# Patient Record
Sex: FEMALE | Race: WHITE | NOT HISPANIC OR LATINO
[De-identification: names, ages, dates, MRNs, and addresses within clinical notes are randomized per-mention and may not be internally consistent; named-entity substitution may affect disease eponyms.]

---

## 2017-01-04 ENCOUNTER — APPOINTMENT (OUTPATIENT)
Dept: UROLOGY | Facility: CLINIC | Age: 52
End: 2017-01-04

## 2017-01-04 VITALS
DIASTOLIC BLOOD PRESSURE: 71 MMHG | HEIGHT: 65 IN | BODY MASS INDEX: 28.32 KG/M2 | SYSTOLIC BLOOD PRESSURE: 111 MMHG | HEART RATE: 62 BPM | WEIGHT: 170 LBS | TEMPERATURE: 97.9 F

## 2017-01-04 DIAGNOSIS — R31.9 HEMATURIA, UNSPECIFIED: ICD-10-CM

## 2017-08-22 ENCOUNTER — RESULT REVIEW (OUTPATIENT)
Age: 52
End: 2017-08-22

## 2018-09-10 ENCOUNTER — RESULT REVIEW (OUTPATIENT)
Age: 53
End: 2018-09-10

## 2019-09-06 ENCOUNTER — RECORD ABSTRACTING (OUTPATIENT)
Age: 54
End: 2019-09-06

## 2019-09-06 DIAGNOSIS — Z86.39 PERSONAL HISTORY OF OTHER ENDOCRINE, NUTRITIONAL AND METABOLIC DISEASE: ICD-10-CM

## 2019-09-06 LAB — CYTOLOGY CVX/VAG DOC THIN PREP: NORMAL

## 2019-09-17 ENCOUNTER — APPOINTMENT (OUTPATIENT)
Dept: OBGYN | Facility: CLINIC | Age: 54
End: 2019-09-17
Payer: COMMERCIAL

## 2019-09-17 VITALS
HEIGHT: 65 IN | DIASTOLIC BLOOD PRESSURE: 80 MMHG | SYSTOLIC BLOOD PRESSURE: 100 MMHG | WEIGHT: 179 LBS | BODY MASS INDEX: 29.82 KG/M2

## 2019-09-17 PROCEDURE — 99396 PREV VISIT EST AGE 40-64: CPT

## 2019-09-17 NOTE — REVIEW OF SYSTEMS
[Nl] : Hematologic/Lymphatic [Irregular Cycle Intervals] : periods are irregular [Hot Flashes] : hot flashes

## 2019-09-17 NOTE — HISTORY OF PRESENT ILLNESS
[Good] : being in good health [Healthy Diet] : a healthy diet [Regular Exercise] : regular exercise [Last Mammogram ___] : Last Mammogram was [unfilled] [Last Bone Density ___] : Last bone density studies [unfilled] [Last Pap ___] : Last cervical pap smear was [unfilled] [Pregnancy History] : pregnancy history: [Total Preg ___] : [unfilled] [Full Term ___] : [unfilled] [Premature ___] : [unfilled] [Living ___] : [unfilled] [Menarche Age: ____] : age at menarche was [unfilled] [Sexually Active] : is sexually active [Monogamous] : is monogamous [Contraception] : uses contraception [Condoms] : uses condoms [Male ___] : [unfilled] male [No] : no [Last Colonoscopy ___] : Last colonoscopy [unfilled] [Menstrual Problems] : reports normal menses [Hot Flashes] : hot flashes [Night Sweats] : night sweats [Definite:  ___ (Date)] : the last menstrual period was [unfilled] [Experiencing Menopausal Sxs] : experiencing menopausal symptoms

## 2019-09-17 NOTE — PHYSICAL EXAM
[Alert] : alert [Awake] : awake [Mass] : no breast mass [Nipple Discharge] : no nipple discharge [Soft] : soft [Axillary LAD] : no axillary lymphadenopathy [Tender] : non tender [Distended] : not distended [Oriented x3] : oriented to person, place, and time [Normal] : uterus [Motion Tenderness] : there was no cervical motion tenderness [Tenderness] : nontender [Enlarged ___ wks] : not enlarged [Mass ___ cm] : no uterine mass was palpated [Uterine Adnexae] : were not tender and not enlarged

## 2019-09-19 LAB — HPV HIGH+LOW RISK DNA PNL CVX: NOT DETECTED

## 2019-09-23 LAB — CYTOLOGY CVX/VAG DOC THIN PREP: ABNORMAL

## 2020-10-21 ENCOUNTER — APPOINTMENT (OUTPATIENT)
Dept: OBGYN | Facility: CLINIC | Age: 55
End: 2020-10-21
Payer: COMMERCIAL

## 2020-10-21 VITALS
WEIGHT: 186 LBS | SYSTOLIC BLOOD PRESSURE: 110 MMHG | DIASTOLIC BLOOD PRESSURE: 80 MMHG | TEMPERATURE: 97.3 F | HEIGHT: 65 IN | BODY MASS INDEX: 30.99 KG/M2

## 2020-10-21 DIAGNOSIS — Z83.3 FAMILY HISTORY OF DIABETES MELLITUS: ICD-10-CM

## 2020-10-21 PROCEDURE — 99396 PREV VISIT EST AGE 40-64: CPT

## 2020-10-21 PROCEDURE — 99072 ADDL SUPL MATRL&STAF TM PHE: CPT

## 2020-10-22 NOTE — HISTORY OF PRESENT ILLNESS
[Patient reported mammogram was normal] : Patient reported mammogram was normal [Patient reported breast sonogram was normal] : Patient reported breast sonogram was normal [Patient reported PAP Smear was normal] : Patient reported PAP Smear was normal [HPV test offered] : HPV test offered [N] : Patient does not use contraception [Monogamous (Male Partner)] : is monogamous with a male partner [Y] : Positive pregnancy history [Currently Active] : currently active [Men] : men [Patient refuses STI testing] : Patient refuses STI testing [Menarche Age: ____] : age at menarche was [unfilled] [Hot Flashes] : hot flashes [No] : none [Vaginal Lubrication] : vaginal lubrication [Behavioral Modification] : behavioral modification [TextBox_4] : Yara is here for an annual exam. she denies gyn complaints. She is skipping menses.  Denies intermenstrual spotting or heavy/prolonged bleeding.   [Mammogramdate] : 10/03/2019 [TextBox_19] : b1 [BreastSonogramDate] : 10/03/2019 [TextBox_25] : b2 [PapSmeardate] : 09/17/2019 [TextBox_31] : neg [HPVDate] : 09/17/2019 [TextBox_78] : neg [LMPDate] : 07/18/2020 [PGHxTotal] : 4 [[facility]Full[facility]] : 2 [PGxPremature] : 2 [[facility]] : 4 [FreeTextEntry1] : 07/18/2020

## 2020-10-22 NOTE — PLAN
[FreeTextEntry1] : - mammo/sono rx given.\par - definitions of perimenopause and menopause were reviewed.  call parameters for irregular bleeding were reviewed\par - f/u pap results\par - The benefits of adequate calcium and vitamin D3 intake combined with weight bearing exercise for bone protection were reviewed.\par

## 2020-10-27 LAB
CYTOLOGY CVX/VAG DOC THIN PREP: ABNORMAL
HPV HIGH+LOW RISK DNA PNL CVX: NOT DETECTED

## 2020-11-06 ENCOUNTER — APPOINTMENT (OUTPATIENT)
Dept: COLORECTAL SURGERY | Facility: CLINIC | Age: 55
End: 2020-11-06
Payer: COMMERCIAL

## 2020-11-06 VITALS
WEIGHT: 186 LBS | DIASTOLIC BLOOD PRESSURE: 72 MMHG | HEIGHT: 65 IN | BODY MASS INDEX: 30.99 KG/M2 | RESPIRATION RATE: 14 BRPM | SYSTOLIC BLOOD PRESSURE: 108 MMHG | HEART RATE: 62 BPM

## 2020-11-06 DIAGNOSIS — Z86.010 PERSONAL HISTORY OF COLONIC POLYPS: ICD-10-CM

## 2020-11-06 DIAGNOSIS — Z12.11 ENCOUNTER FOR SCREENING FOR MALIGNANT NEOPLASM OF COLON: ICD-10-CM

## 2020-11-06 PROCEDURE — 99243 OFF/OP CNSLTJ NEW/EST LOW 30: CPT

## 2020-11-06 PROCEDURE — 99072 ADDL SUPL MATRL&STAF TM PHE: CPT

## 2020-11-06 NOTE — PHYSICAL EXAM
[No Rash or Lesion] : No rash or lesion [Alert] : alert [Oriented to Person] : oriented to person [Oriented to Place] : oriented to place [Oriented to Time] : oriented to time [Calm] : calm [de-identified] : Soft, nontender and nondistended [de-identified] : No apparent distress [de-identified] : Normocephalic atraumatic [de-identified] : Moving all extremities x4

## 2020-11-06 NOTE — HISTORY OF PRESENT ILLNESS
[FreeTextEntry1] : Ms. De Souza presents to the office for consultation for colonoscopy given a personal history of colon polyps.  She reports having undergone her initial scope 3 years prior with Dr. Perez.  At that time, several polyps were found and she was advised to undergo a repeat colonoscopy 3 years later.  Her insurance coverage has since changed, and she is unable to follow-up with his gastroenterologist.  She is here to establish care.  She denies any abdominal pain, blood in her stool or recent change in bowel habits.  No family history of colon or rectal cancer.

## 2020-11-06 NOTE — ASSESSMENT
[FreeTextEntry1] : Ms. De Souza presents to the office for discussion of a screening colonoscopy given a personal history of colon polyps. The risks/benefits/alternatives for a colonoscopy were discussed. These include a less than 1% risk of bleeding should any polyps be biopsied and/or removed. There is also a less than 0.1% risk of perforation. The patient understands the need to adhere to a clear liquid diet the day prior to procedure as well as having to perform a bowel prep in order to allow for adequate visualization of the mucosal surfaces.  Followup colonoscopies will be scheduled based on the findings that are seen at the time of the procedure. Patient understands and is agreeable, and will proceed with consent and scheduling.\par

## 2020-11-10 ENCOUNTER — APPOINTMENT (OUTPATIENT)
Dept: COLORECTAL SURGERY | Facility: CLINIC | Age: 55
End: 2020-11-10

## 2020-11-20 ENCOUNTER — TRANSCRIPTION ENCOUNTER (OUTPATIENT)
Age: 55
End: 2020-11-20

## 2020-12-23 PROBLEM — Z12.11 ENCOUNTER FOR SCREENING COLONOSCOPY: Status: RESOLVED | Noted: 2020-11-06 | Resolved: 2020-12-23

## 2021-01-08 ENCOUNTER — TRANSCRIPTION ENCOUNTER (OUTPATIENT)
Age: 56
End: 2021-01-08

## 2021-01-14 ENCOUNTER — OUTPATIENT (OUTPATIENT)
Dept: OUTPATIENT SERVICES | Facility: HOSPITAL | Age: 56
LOS: 1 days | End: 2021-01-14
Payer: COMMERCIAL

## 2021-01-14 VITALS
HEART RATE: 68 BPM | DIASTOLIC BLOOD PRESSURE: 85 MMHG | HEIGHT: 65 IN | SYSTOLIC BLOOD PRESSURE: 118 MMHG | RESPIRATION RATE: 16 BRPM | OXYGEN SATURATION: 99 % | WEIGHT: 188.05 LBS | TEMPERATURE: 98 F

## 2021-01-14 DIAGNOSIS — K08.409 PARTIAL LOSS OF TEETH, UNSPECIFIED CAUSE, UNSPECIFIED CLASS: Chronic | ICD-10-CM

## 2021-01-14 DIAGNOSIS — Z01.818 ENCOUNTER FOR OTHER PREPROCEDURAL EXAMINATION: ICD-10-CM

## 2021-01-14 DIAGNOSIS — Z98.890 OTHER SPECIFIED POSTPROCEDURAL STATES: Chronic | ICD-10-CM

## 2021-01-14 DIAGNOSIS — Z12.11 ENCOUNTER FOR SCREENING FOR MALIGNANT NEOPLASM OF COLON: ICD-10-CM

## 2021-01-14 LAB
ANION GAP SERPL CALC-SCNC: 3 MMOL/L — LOW (ref 5–17)
BASOPHILS # BLD AUTO: 0.04 K/UL — SIGNIFICANT CHANGE UP (ref 0–0.2)
BASOPHILS NFR BLD AUTO: 0.8 % — SIGNIFICANT CHANGE UP (ref 0–2)
BUN SERPL-MCNC: 17 MG/DL — SIGNIFICANT CHANGE UP (ref 7–23)
CALCIUM SERPL-MCNC: 9.2 MG/DL — SIGNIFICANT CHANGE UP (ref 8.5–10.1)
CHLORIDE SERPL-SCNC: 109 MMOL/L — HIGH (ref 96–108)
CO2 SERPL-SCNC: 28 MMOL/L — SIGNIFICANT CHANGE UP (ref 22–31)
CREAT SERPL-MCNC: 0.88 MG/DL — SIGNIFICANT CHANGE UP (ref 0.5–1.3)
EOSINOPHIL # BLD AUTO: 0.05 K/UL — SIGNIFICANT CHANGE UP (ref 0–0.5)
EOSINOPHIL NFR BLD AUTO: 1 % — SIGNIFICANT CHANGE UP (ref 0–6)
GLUCOSE SERPL-MCNC: 87 MG/DL — SIGNIFICANT CHANGE UP (ref 70–99)
HCT VFR BLD CALC: 43.9 % — SIGNIFICANT CHANGE UP (ref 34.5–45)
HGB BLD-MCNC: 14.2 G/DL — SIGNIFICANT CHANGE UP (ref 11.5–15.5)
IMM GRANULOCYTES NFR BLD AUTO: 0.6 % — SIGNIFICANT CHANGE UP (ref 0–1.5)
LYMPHOCYTES # BLD AUTO: 1.12 K/UL — SIGNIFICANT CHANGE UP (ref 1–3.3)
LYMPHOCYTES # BLD AUTO: 21.5 % — SIGNIFICANT CHANGE UP (ref 13–44)
MCHC RBC-ENTMCNC: 30.9 PG — SIGNIFICANT CHANGE UP (ref 27–34)
MCHC RBC-ENTMCNC: 32.3 GM/DL — SIGNIFICANT CHANGE UP (ref 32–36)
MCV RBC AUTO: 95.6 FL — SIGNIFICANT CHANGE UP (ref 80–100)
MONOCYTES # BLD AUTO: 0.42 K/UL — SIGNIFICANT CHANGE UP (ref 0–0.9)
MONOCYTES NFR BLD AUTO: 8 % — SIGNIFICANT CHANGE UP (ref 2–14)
NEUTROPHILS # BLD AUTO: 3.56 K/UL — SIGNIFICANT CHANGE UP (ref 1.8–7.4)
NEUTROPHILS NFR BLD AUTO: 68.1 % — SIGNIFICANT CHANGE UP (ref 43–77)
PLATELET # BLD AUTO: 297 K/UL — SIGNIFICANT CHANGE UP (ref 150–400)
POTASSIUM SERPL-MCNC: 3.8 MMOL/L — SIGNIFICANT CHANGE UP (ref 3.5–5.3)
POTASSIUM SERPL-SCNC: 3.8 MMOL/L — SIGNIFICANT CHANGE UP (ref 3.5–5.3)
RBC # BLD: 4.59 M/UL — SIGNIFICANT CHANGE UP (ref 3.8–5.2)
RBC # FLD: 13.7 % — SIGNIFICANT CHANGE UP (ref 10.3–14.5)
SODIUM SERPL-SCNC: 140 MMOL/L — SIGNIFICANT CHANGE UP (ref 135–145)
WBC # BLD: 5.22 K/UL — SIGNIFICANT CHANGE UP (ref 3.8–10.5)
WBC # FLD AUTO: 5.22 K/UL — SIGNIFICANT CHANGE UP (ref 3.8–10.5)

## 2021-01-14 PROCEDURE — 93005 ELECTROCARDIOGRAM TRACING: CPT

## 2021-01-14 PROCEDURE — 80048 BASIC METABOLIC PNL TOTAL CA: CPT

## 2021-01-14 PROCEDURE — G0463: CPT | Mod: 25

## 2021-01-14 PROCEDURE — 85025 COMPLETE CBC W/AUTO DIFF WBC: CPT

## 2021-01-14 PROCEDURE — 36415 COLL VENOUS BLD VENIPUNCTURE: CPT

## 2021-01-14 PROCEDURE — 93010 ELECTROCARDIOGRAM REPORT: CPT

## 2021-01-14 RX ORDER — LEVOTHYROXINE SODIUM 125 MCG
1 TABLET ORAL
Qty: 0 | Refills: 0 | DISCHARGE

## 2021-01-14 NOTE — H&P PST ADULT - NSANTHOSAYNRD_GEN_A_CORE
No. ANNEL screening performed.  STOP BANG Legend: 0-2 = LOW Risk; 3-4 = INTERMEDIATE Risk; 5-8 = HIGH Risk

## 2021-01-14 NOTE — H&P PST ADULT - NSICDXPASTMEDICALHX_GEN_ALL_CORE_FT
PAST MEDICAL HISTORY:  Colon polyp     COVID-19 December 2020    Hashimoto's thyroiditis     Hypothyroidism

## 2021-01-14 NOTE — H&P PST ADULT - HISTORY OF PRESENT ILLNESS
56 year old female with history of colon polyp she presents to [facility] for planned colonoscopy  56 year old female with history of colon polyp she presents to [facility] for planned colonoscopy ; denies abdominal pain and

## 2021-01-14 NOTE — H&P PST ADULT - ASSESSMENT
56 year old female presents to PST for planned colonoscopy  1.  Dr Cooper  office  will instruct patient regarding bowel prep and  medication regimen on day of procedure.  2. Endoscopy booking will call patient the day before surgery for arrival instructions   3. NPO post midnight  4. CBC BMP EKG as per Dr Cooper   5. Patient instructed to have responsible adult accompany/ drive pt home after procedure  6. denies signs of covid cough fever  7. pt scheduled covid test

## 2021-01-15 DIAGNOSIS — Z01.818 ENCOUNTER FOR OTHER PREPROCEDURAL EXAMINATION: ICD-10-CM

## 2021-01-15 DIAGNOSIS — Z12.11 ENCOUNTER FOR SCREENING FOR MALIGNANT NEOPLASM OF COLON: ICD-10-CM

## 2021-01-18 ENCOUNTER — APPOINTMENT (OUTPATIENT)
Dept: DISASTER EMERGENCY | Facility: CLINIC | Age: 56
End: 2021-01-18

## 2021-01-18 DIAGNOSIS — Z01.818 ENCOUNTER FOR OTHER PREPROCEDURAL EXAMINATION: ICD-10-CM

## 2021-01-19 LAB — SARS-COV-2 N GENE NPH QL NAA+PROBE: NOT DETECTED

## 2021-02-02 PROBLEM — U07.1 COVID-19: Chronic | Status: ACTIVE | Noted: 2021-01-14

## 2021-02-02 PROBLEM — E03.9 HYPOTHYROIDISM, UNSPECIFIED: Chronic | Status: ACTIVE | Noted: 2021-01-14

## 2021-02-02 PROBLEM — E06.3 AUTOIMMUNE THYROIDITIS: Chronic | Status: ACTIVE | Noted: 2021-01-14

## 2021-02-02 PROBLEM — K63.5 POLYP OF COLON: Chronic | Status: ACTIVE | Noted: 2021-01-14

## 2021-02-08 ENCOUNTER — APPOINTMENT (OUTPATIENT)
Dept: DISASTER EMERGENCY | Facility: CLINIC | Age: 56
End: 2021-02-08

## 2021-02-09 LAB — SARS-COV-2 N GENE NPH QL NAA+PROBE: NOT DETECTED

## 2021-02-11 ENCOUNTER — APPOINTMENT (OUTPATIENT)
Dept: COLORECTAL SURGERY | Facility: CLINIC | Age: 56
End: 2021-02-11
Payer: COMMERCIAL

## 2021-02-11 PROCEDURE — 45378 DIAGNOSTIC COLONOSCOPY: CPT

## 2021-10-27 ENCOUNTER — NON-APPOINTMENT (OUTPATIENT)
Age: 56
End: 2021-10-27

## 2021-10-27 ENCOUNTER — APPOINTMENT (OUTPATIENT)
Dept: OBGYN | Facility: CLINIC | Age: 56
End: 2021-10-27
Payer: COMMERCIAL

## 2021-10-27 VITALS
BODY MASS INDEX: 32.36 KG/M2 | WEIGHT: 194.25 LBS | SYSTOLIC BLOOD PRESSURE: 114 MMHG | DIASTOLIC BLOOD PRESSURE: 62 MMHG | HEIGHT: 65 IN | TEMPERATURE: 97 F

## 2021-10-27 PROCEDURE — 99396 PREV VISIT EST AGE 40-64: CPT

## 2021-10-27 NOTE — DISCUSSION/SUMMARY
[FreeTextEntry1] : \par 55 y/o\par gyn annual\par -pap w/ HPV\par -Mammo and breast US script\par - Definitions of perimenopause and menopause were reviewed. call parameters for irregular bleeding were reviewed\par - The benefits of adequate calcium and vitamin D3 intake combined with weight bearing exercise for bone protection were reviewed.

## 2021-10-27 NOTE — HISTORY OF PRESENT ILLNESS
[Patient reported mammogram was normal] : Patient reported mammogram was normal [Patient reported breast sonogram was normal] : Patient reported breast sonogram was normal [Patient reported PAP Smear was normal] : Patient reported PAP Smear was normal [N] : Patient does not use contraception [Y] : Positive pregnancy history [Currently Active] : currently active [Men] : men [FreeTextEntry1] : \par Yara is 57 y/o presents for an annual well woman gyn visit.\par \par Reports she is doing well.\par \par LMP was on 07/31/21.\par \par Reports occasional hot flashes that are tolerable and denies GYN complaints.\par  [Mammogramdate] : 11/20/2020 [TextBox_19] : BR1 [BreastSonogramDate] : 11/20/2020 [TextBox_25] : BR2 [PapSmeardate] : 10/21/2020 [TextBox_31] : NEG [HPVDate] : 10/21/2020 [TextBox_78] : NEG [PGHxTotal] : 4 [[facility]FullTerm] : 2 [PGxPremature] : 2 [[facility]] : 4

## 2021-10-29 LAB — HPV HIGH+LOW RISK DNA PNL CVX: NOT DETECTED

## 2021-11-03 LAB — CYTOLOGY CVX/VAG DOC THIN PREP: ABNORMAL

## 2022-02-15 NOTE — H&P PST ADULT - CONSTITUTIONAL DETAILS
Patient:   SABINA CANALES            MRN: [id number]            FIN: [id number]               Age:   42 years     Sex:  Female     :  1979   Associated Diagnoses:   Hypoparathyroidism; Hypocalcemia   Author:   Jr Harris MD      Visit Information      Date of Service: 2021 11:15 am  Performing Location: [facility]  Encounter#: [id number]      Primary Care Provider (PCP):  Vladimir Mahajan MD    NPI# [id number]      Referring Provider:  Jr Harris MD    NPI# [id number]      Chief Complaint   2021 11:22 AM CDT   consult per GJM - hypocalcemia/hypoparathyroidism              Additional Information:No additional information recorded during visit.   Chief complaint and symptoms as noted above and confirmed with patient.  Recent lab and diagnostic studies reviewed with patient      History of Present Illness   2021: Sabina is referred to me by Dr. Bunn for evaluation of symptomatic hypocalcemia.  Seen earlier this month with hand carpal spasms bilaterally and facial numbness both of which she has experienced chronically related to history of known hypoparathyroidism.  Underwent remote thyroidectomy back in .  Was told that multiple parathyroid glands were maintain though has suffered from hypoparathyroidism ever since.  Most recently seen in clinic with a calcium of 6.6.  She was arranged to receive 2 g of IV calcium through the infusion center.  Subsequently had her calcitriol increased from 1 mcg daily up to 1 mcg twice a day.  Typically takes 3000 to 5000 mg of calcium carbonate per day.  Was seen last week for follow-up labs and EKG which showed general improvement.  Also has not had same symptom features since that time.         Review of Systems   Constitutional:  No fever, No chills.    Eye:  Negative except as documented in history of present illness.    Ear/Nose/Mouth/Throat:  Negative except as documented in history of present illness.    Respiratory:  No shortness  of breath.    Cardiovascular:  No chest pain, No palpitations, No peripheral edema, No syncope.    Gastrointestinal:  No nausea, No vomiting, No abdominal pain.    Genitourinary:  No dysuria, No hematuria.    Hematology/Lymphatics:  Negative except as documented in history of present illness.    Endocrine:  No excessive thirst, No polyuria.    Immunologic:  No recurrent fevers.    Musculoskeletal:  muscle spasms, No joint pain, No muscle pain.    Neurologic:  Alert and oriented X4, Numbness, Tingling, No headache.       Health Status   Allergies:    Allergic Reactions (Selected)  Severity Not Documented  Rondec (Rash)  Nonallergic Reactions (Selected)  Severity Not Documented  Wellbutrin XL (Rash)   Medications:  (Selected)   Prescriptions  Prescribed  Pataday 0.2% ophthalmic solution: 1 drop(s), both eyes, daily, # 2.5 mL, 3 Refill(s), Type: Maintenance, Pharmacy: [facility] #[id number], 1 drop(s) Eye-Both daily  SUMAtriptan 100 mg oral tablet: = 1 tab(s), Oral, daily, Instructions: AS DIRECTED., PRN: AS NEEDED FOR MIGRAINE HEADACHE, # 15 tab(s), 1 Refill(s), Type: Soft Stop, Pharmacy: [facility] #[id number], 1 tab(s) Oral daily,PRN:AS NEEDED FOR MIGRAINE HEADACHE,Instr:AS DIRECTED., 62...  calcitriol 0.5 mcg oral capsule: = 2 cap(s), Oral, bid, # 360 cap(s), 3 Refill(s), Type: Maintenance, Pharmacy: [facility] #[id number], 2 cap(s) Oral bid, 62.5, in, 06/29/21 11:22:00 CDT, Height Measured, 141.2, lb, 06/29/21 11:22:00 CDT, Weight Measured  levothyroxine 150 mcg (0.15 mg) oral tablet: = 1 tab(s), Oral, daily, # 90 tab(s), 3 Refill(s), Type: Maintenance, Pharmacy: [facility] #[id number], 1 tab(s) Oral daily, 62.5, in, 11/09/20 13:23:00 CST, Height Measured, 138, lb, 02/23/21 14:31:00 CST, Weight Measured  topiramate 50 mg oral tablet: = 1 tab(s), Oral, daily, # 90 tab(s), 3 Refill(s), Type: Maintenance, Pharmacy: [facility] #[id number], 1 tab(s) Oral daily, 62.5, in, 11/09/20 13:23:00 CST,  Height Measured, 138, lb, 02/23/21 14:31:00 CST, Weight Measured  Documented Medications  Documented  Prenatal Multivitamins: Oral, daily, 0 Refill(s), Type: Maintenance  Tums Ultra 1000 mg oral tablet, chewable: Instructions: Up to 5 tabs daily, 0 Refill(s),    Medications          *denotes recorded medication          SUMAtriptan 100 mg oral tablet: 1 tab(s), Oral, daily, AS DIRECTED., PRN: AS NEEDED FOR MIGRAINE HEADACHE, 15 tab(s), 1 Refill(s).          calcitriol 0.5 mcg oral capsule: 2 cap(s), Oral, bid, 360 cap(s), 3 Refill(s).          *Tums Ultra 1000 mg oral tablet, chewable: Up to 5 tabs daily.          levothyroxine 150 mcg (0.15 mg) oral tablet: 1 tab(s), Oral, daily, 90 tab(s), 3 Refill(s).          *Prenatal Multivitamins: Oral, daily, 0 Refill(s).          Pataday 0.2% ophthalmic solution: 1 drop(s), both eyes, daily, 2.5 mL, 3 Refill(s).          topiramate 50 mg oral tablet: 1 tab(s), Oral, daily, 90 tab(s), 3 Refill(s).       Problem list:    All Problems  Acquired hypothyroidism / SNOMED CT 822886531 / Confirmed  Hypocalcemia / SNOMED CT 7201680 / Confirmed  Migraines / SNOMED CT 88437657 / Confirmed  Mild depression / SNOMED CT 281896303 / Confirmed  Obesity / SNOMED CT 6036221561 / Probable  Hypoparathyroidism / SNOMED CT 041081127 / Confirmed  Tobacco abuse / ICD-9-.1 / Confirmed  Resolved: *Hospitalized@[facility] Hypocalcemia  Resolved: AMA (advanced maternal age) multigravida 35+ / SNOMED CT 4085244560  Resolved: Pregnancy / SNOMED CT 358837018  Resolved: Pregnancy / SNOMED CT 153661817  Resolved: Pregnant / SNOMED CT 184974563      Histories   Past Medical History:    Active  Acquired hypothyroidism (656338722)  Hypoparathyroidism (602788959)  Mild depression (024173280)  Tobacco abuse (305.1)  Hypocalcemia (0073060)  Migraines (22075154)  Resolved  *Hospitalized@[facility] - Hypocalcemia: Onset on 11/25/2013 at 34 years.  Resolved on 11/26/2013 at 34 years.  Pregnancy (156562708):  Resolved  in  at 19 years.  Pregnancy (122915606):  Resolved in  at 27 years.  AMA (advanced maternal age) multigravida 35+ (4056777467):  Resolved.   Family History:    Alive and well  Mother  Father     Procedure history:     delivery (0193691250) on 2016 at 37 Years.  HSG - Hysterosalpingogram (438027387) on 10/5/2015 at 36 Years.  Appendectomy (963761053) on 2010 at 31 Years.  Laparoscopic cholecystectomy (72632731) on 2010 at 31 Years.   section (89929242) on 2007 at 27 Years.  Thyroidectomy (02605917) on 3/18/2003 at 24 Years.  Ts and As - Tonsillectomy and adenoidectomy (7446554868) in  at 14 Years.  Myringotomy and insertion of T tube (547880442).   Social History:        Electronic Cigarette/Vaping Assessment            Electronic Cigarette Use: Never.      Alcohol Assessment: Current            1 x per month, 3 drinks/episode average.  5 drinks/episode maximum.      Tobacco Assessment: Current            4 or less cigarettes(less than 1/4 pack)/day in last 30 days, Cigarettes, 3 per day.      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      Home and Environment Assessment            Marital status:  (Living together).  Spouse/Partner name: Ravindra.  Lives with Children, Spouse.  2               children.  Living situation: Home/Independent.      Nutrition and Health Assessment            Type of diet: Regular.      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      Other Assessment            First menses age 17.  Irregular menses.  Menstrual duration 5 days.  Cycle interval 23 days.  No history of               abnormal  Pap smear.        Physical Examination   vital signs stable, as noted above   Vital Signs   6/29/2021 11:22 AM CDT Temperature Tympanic 97.9 DegF    Peripheral Pulse Rate 62 bpm    Systolic Blood Pressure 125 mmHg    Diastolic Blood Pressure 81 mmHg  HI    Mean Arterial Pressure 96 mmHg    Oxygen Saturation 100 %      Measurements from flowsheet : Measurements   6/29/2021 11:22 AM CDT Height Measured - Standard 62.5 in    Height/Length Estimated 62.5 in    Weight Measured - Standard 141.2 lb    BSA 1.68 m2    Body Mass Index 25.41 kg/m2  HI      General:  Alert and oriented, No acute distress.    Eye:  Extraocular movements are intact.    HENT:  Normocephalic, Oral mucosa is moist.    Neck:  Supple, Non-tender, No carotid bruit, No jugular venous distention, No lymphadenopathy.    Respiratory:  Lungs are clear to auscultation, Respirations are non-labored.    Cardiovascular:  Normal rate, Regular rhythm, No murmur, No edema.    Gastrointestinal:  Soft.    Musculoskeletal:  Normal range of motion, Normal strength, No tenderness.    Neurologic:  Alert, Oriented, Normal motor function, No focal deficits, inducible carpospasm wth blood pressure cuff insufflation in both forearms (+ Trousseau sign).    Cognition and Speech:  Oriented, Speech clear and coherent.    Psychiatric:  Appropriate mood & affect.       Review / Management   Results review:  Lab results   6/21/2021 10:25 AM CDT Sodium Level 137 mmol/L    Potassium Level 4.0 mmol/L    Chloride Level 105 mmol/L    CO2 Level 26 mmol/L    Glucose Level 88 mg/dL    BUN 20 mg/dL    Creatinine 1.18 mg/dL  HI    BUN/Creat Ratio 17    eGFR 58 mL/min/1.73m2  LOW    eGFR African American 67 mL/min/1.73m2    Calcium Level 7.7 mg/dL  LOW    Total  unit/L  HI   6/18/2021 9:27 AM CDT Sodium Level 139 mmol/L    Potassium Level 3.8 mmol/L    Chloride Level 104 mmol/L    CO2 Level 27 mmol/L    Glucose Level 111 mg/dL  HI    BUN 11 mg/dL    Creatinine 1.15 mg/dL  HI     BUN/Creat Ratio 10    eGFR 59 mL/min/1.73m2  LOW    eGFR  69 mL/min/1.73m2    Calcium Level 6.9 mg/dL  LOW    Bili Total 0.3 mg/dL    Bili Direct 0.1 mg/dL    Bili Indirect 0.2    Alk Phos 38 unit/L    AST/SGOT 14 unit/L    ALT/SGPT 11 unit/L    Protein Total 6.1 gm/dL    Albumin Level 4.0 gm/dL    Globulin 2.1    A/G Ratio 1.9   6/16/2021 5:50 PM CDT Sodium Level 140 mmol/L    Potassium Level 4.1 mmol/L    Chloride Level 106 mmol/L    CO2 Level 26 mmol/L    Glucose Level 92 mg/dL    BUN 11 mg/dL    Creatinine 1.35 mg/dL  HI    BUN/Creat Ratio 8    eGFR 49 mL/min/1.73m2  LOW    eGFR African American 57 mL/min/1.73m2  LOW    Calcium Level 6.5 mg/dL  LOW    Calcium Level 6.6 mg/dL  LOW    Calcium Ionized 3.4 mg/dL  LOW    Phosphorus Level 4.4 mg/dL    Magnesium Level 1.8 mg/dL    PTH Intact <1 pg/mL    Vitamin D 25 OH 39 ng/mL   .       Impression and Plan   Diagnosis     Hypoparathyroidism (TYQ84-OK E20.9).     Hypocalcemia (HEU74-WE E83.51).         .) symptomatic hypocalcemia, attributable to remote h/o thyroidectomy in 2003 and resultant hypoparathyroidism  - recent presentation on 6/16 with carpospasm and perioral numbness with calcium of 6.6 (25 hydroxyvitamin D 38, PTH <1, magnesium 1.8, phos 4.4)   - treated with calcium gluconate 2g IV through [facility] infusion center  - subsequent calcium levels of 6.9 (6/18) and 7.7 (6/21)  - ECG (6/21): NSR with normal QTc  - previously advised to increase her calcitriol up from 1mcg daily to 1mcg BID; has remained on calcium carbonate 3000-5000mg/day  - positive Trousseau sign today in clinic  - discussed in detail the importance of activated vitamin D in setting of hypoparathyroidism.  Given normal 25 hydroxvitamin D levels, she is in need of higher replacement 1,25 hydroxyvitamin D  - advised to remain on current calcitriol 1mcg BID  and to take with calcium carbonate 1000mg BID.  Generally she won't absorb any more than 500mg of calcium at one time and  additional calcium >500mg likely not to absorb.    - if developing future symptoms episodes, advised to increase calcitriol 1mg/calcium carbonate 1000mg to TID dosing and can take additional 1000-2000mg of calcium/day if she chooses  - will have her return for labs in 6 weeks - BMP.  Assuming normal calcium levels, I don't think any further dedicated follow up with me necessary      Professional Services   Counseling Summary:  This was a 45 minute visit with greater than 50% of that time spent counseling the patient, including chart review, communications with GJM and discussion of hypoparathyroidism pathology with pt..   no distress

## 2022-10-24 ENCOUNTER — NON-APPOINTMENT (OUTPATIENT)
Age: 57
End: 2022-10-24

## 2022-11-16 ENCOUNTER — APPOINTMENT (OUTPATIENT)
Dept: OBGYN | Facility: CLINIC | Age: 57
End: 2022-11-16

## 2022-11-17 ENCOUNTER — NON-APPOINTMENT (OUTPATIENT)
Age: 57
End: 2022-11-17

## 2022-11-17 ENCOUNTER — APPOINTMENT (OUTPATIENT)
Dept: OBGYN | Facility: CLINIC | Age: 57
End: 2022-11-17

## 2022-11-17 VITALS
BODY MASS INDEX: 26.82 KG/M2 | WEIGHT: 161 LBS | DIASTOLIC BLOOD PRESSURE: 68 MMHG | HEIGHT: 65 IN | SYSTOLIC BLOOD PRESSURE: 120 MMHG

## 2022-11-17 DIAGNOSIS — Z12.4 ENCOUNTER FOR SCREENING FOR MALIGNANT NEOPLASM OF CERVIX: ICD-10-CM

## 2022-11-17 PROCEDURE — 99396 PREV VISIT EST AGE 40-64: CPT

## 2022-11-17 RX ORDER — LEVOTHYROXINE SODIUM 0.1 MG/1
100 TABLET ORAL
Refills: 0 | Status: ACTIVE | COMMUNITY

## 2022-11-17 RX ORDER — PHENTERMINE HYDROCHLORIDE 30 MG/1
30 CAPSULE ORAL
Qty: 30 | Refills: 0 | Status: ACTIVE | COMMUNITY
Start: 2022-10-03

## 2022-11-17 RX ORDER — LEVOTHYROXINE SODIUM 100 UG/1
100 TABLET ORAL
Refills: 0 | Status: DISCONTINUED | COMMUNITY
End: 2022-11-17

## 2022-11-17 NOTE — PHYSICAL EXAM
[Chaperone Present] : A chaperone was present in the examining room during all aspects of the physical examination [FreeTextEntry1] : ABEBE [Appropriately responsive] : appropriately responsive [Alert] : alert [Soft] : soft [No Acute Distress] : no acute distress [Non-tender] : non-tender [Non-distended] : non-distended [No Lesions] : no lesions [No Mass] : no mass [Oriented x3] : oriented x3 [Examination Of The Breasts] : a normal appearance [No Masses] : no breast masses were palpable [Atrophy] : atrophy [Normal] : normal [Tenderness] : nontender [Enlarged ___ wks] : not enlarged [Mass ___ cm] : no uterine mass was palpated [Uterine Adnexae] : non-palpable

## 2022-11-17 NOTE — HISTORY OF PRESENT ILLNESS
[N] : Patient does not use contraception [Y] : Patient is sexually active [Menarche Age: ____] : age at menarche was [unfilled] [Currently Active] : currently active [Men] : men [Vaginal] : vaginal [No] : No [TextBox_4] : Yara is here for an annual visit. She denies gyn complaints and is doing well.\par \par  [Mammogramdate] : 12/17/21 [TextBox_19] : br 1 [BreastSonogramDate] : 12/17/21 [TextBox_25] : br 2 [PapSmeardate] : 10/27/21 [TextBox_31] : negative atrophic  [LMPDate] : 7/2021 [[facility]] : 2 [PGHxTotal] : 4 [PGxPremature] : 2 [[facility]] : 4 [TextBox_6] : 7/2021 [TextBox_9] : 13 [FreeTextEntry1] : 7/2021

## 2022-11-17 NOTE — PLAN
[FreeTextEntry1] : - mammo/sono ordered\par - self breast exams encouraged\par - pap obtained\par - up to date with colorectal screening

## 2022-11-18 LAB — HPV HIGH+LOW RISK DNA PNL CVX: NOT DETECTED

## 2022-11-29 LAB — CYTOLOGY CVX/VAG DOC THIN PREP: ABNORMAL

## 2023-11-01 ENCOUNTER — NON-APPOINTMENT (OUTPATIENT)
Age: 58
End: 2023-11-01

## 2023-11-29 ENCOUNTER — APPOINTMENT (OUTPATIENT)
Dept: OBGYN | Facility: CLINIC | Age: 58
End: 2023-11-29
Payer: COMMERCIAL

## 2023-11-29 VITALS
BODY MASS INDEX: 27.49 KG/M2 | HEIGHT: 65 IN | DIASTOLIC BLOOD PRESSURE: 80 MMHG | SYSTOLIC BLOOD PRESSURE: 108 MMHG | WEIGHT: 165 LBS

## 2023-11-29 DIAGNOSIS — Z12.39 ENCOUNTER FOR OTHER SCREENING FOR MALIGNANT NEOPLASM OF BREAST: ICD-10-CM

## 2023-11-29 DIAGNOSIS — Z01.419 ENCOUNTER FOR GYNECOLOGICAL EXAMINATION (GENERAL) (ROUTINE) W/OUT ABNORMAL FINDINGS: ICD-10-CM

## 2023-11-29 DIAGNOSIS — Z13.820 ENCOUNTER FOR SCREENING FOR OSTEOPOROSIS: ICD-10-CM

## 2023-11-29 DIAGNOSIS — R92.30 DENSE BREASTS, UNSPECIFIED: ICD-10-CM

## 2023-11-29 PROCEDURE — 99396 PREV VISIT EST AGE 40-64: CPT

## 2023-12-16 NOTE — HISTORY OF PRESENT ILLNESS
[TextBox_4] : Yara is here for her annual exam. She is doing well overall, denies gyn complaints.  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 [Mammogramdate] : 12/22/22 [TextBox_19] : BR 1 [BreastSonogramDate] : 12/22/22 [TextBox_25] : BR 2 [PapSmeardate] : 11/17/22 [ColonoscopyDate] : 2021 [TextBox_78] : neg [HPVDate] : 11/17/22 [LMPDate] : 2020 [PGHxTotal] : 4 [PGxPremature] : 2 [[facility]xFull[facility]] : 2 [[facility]] : 4 [FreeTextEntry1] : 2020

## 2023-12-16 NOTE — PHYSICAL EXAM
[FreeTextEntry1] : ABEBE Carmichael [FreeTextEntry6] : 2cm round area of ecchymosis to left breast near 8 oclock- pt previously unaware of this.

## 2023-12-16 NOTE — COUNSELING
[Nutrition/ Exercise/ Weight Management] : nutrition, exercise, weight management [Vitamins/Supplements] : vitamins/supplements [Breast Self Exam] : breast self exam [FreeTextEntry2] : The benefits of adequate calcium and vitamin D3 intake combined with weight bearing exercise for bone protection were reviewed.

## 2025-01-16 ENCOUNTER — NON-APPOINTMENT (OUTPATIENT)
Age: 60
End: 2025-01-16

## 2025-01-16 ENCOUNTER — APPOINTMENT (OUTPATIENT)
Dept: OBGYN | Facility: CLINIC | Age: 60
End: 2025-01-16
Payer: COMMERCIAL

## 2025-01-16 VITALS
WEIGHT: 175 LBS | BODY MASS INDEX: 29.16 KG/M2 | DIASTOLIC BLOOD PRESSURE: 80 MMHG | HEIGHT: 65 IN | SYSTOLIC BLOOD PRESSURE: 102 MMHG

## 2025-01-16 DIAGNOSIS — Z12.4 ENCOUNTER FOR SCREENING FOR MALIGNANT NEOPLASM OF CERVIX: ICD-10-CM

## 2025-01-16 DIAGNOSIS — R92.30 DENSE BREASTS, UNSPECIFIED: ICD-10-CM

## 2025-01-16 DIAGNOSIS — Z12.39 ENCOUNTER FOR OTHER SCREENING FOR MALIGNANT NEOPLASM OF BREAST: ICD-10-CM

## 2025-01-16 DIAGNOSIS — Z01.419 ENCOUNTER FOR GYNECOLOGICAL EXAMINATION (GENERAL) (ROUTINE) W/OUT ABNORMAL FINDINGS: ICD-10-CM

## 2025-01-16 PROCEDURE — 99396 PREV VISIT EST AGE 40-64: CPT

## 2025-01-16 PROCEDURE — 99459 PELVIC EXAMINATION: CPT

## 2025-01-20 LAB — HPV HIGH+LOW RISK DNA PNL CVX: NOT DETECTED

## 2025-01-22 LAB — CYTOLOGY CVX/VAG DOC THIN PREP: ABNORMAL

## 2025-02-03 ENCOUNTER — APPOINTMENT (OUTPATIENT)
Dept: ORTHOPEDIC SURGERY | Facility: CLINIC | Age: 60
End: 2025-02-03

## 2025-02-03 DIAGNOSIS — S80.02XA CONTUSION OF LEFT KNEE, INITIAL ENCOUNTER: ICD-10-CM

## 2025-02-03 PROCEDURE — 99203 OFFICE O/P NEW LOW 30 MIN: CPT

## 2025-02-04 VITALS — WEIGHT: 175 LBS | BODY MASS INDEX: 29.16 KG/M2 | HEIGHT: 65 IN

## 2025-02-04 PROBLEM — S80.02XA CONTUSION OF LEFT KNEE, INITIAL ENCOUNTER: Status: ACTIVE | Noted: 2025-02-03

## 2025-02-20 ENCOUNTER — APPOINTMENT (OUTPATIENT)
Dept: ORTHOPEDIC SURGERY | Facility: CLINIC | Age: 60
End: 2025-02-20
Payer: COMMERCIAL

## 2025-02-20 DIAGNOSIS — S80.02XA CONTUSION OF LEFT KNEE, INITIAL ENCOUNTER: ICD-10-CM

## 2025-02-20 PROCEDURE — 99213 OFFICE O/P EST LOW 20 MIN: CPT

## 2025-02-24 VITALS — HEIGHT: 65 IN

## 2025-03-04 ENCOUNTER — APPOINTMENT (OUTPATIENT)
Dept: ORTHOPEDIC SURGERY | Facility: CLINIC | Age: 60
End: 2025-03-04
Payer: COMMERCIAL

## 2025-03-04 DIAGNOSIS — S80.02XA CONTUSION OF LEFT KNEE, INITIAL ENCOUNTER: ICD-10-CM

## 2025-03-04 PROCEDURE — 99212 OFFICE O/P EST SF 10 MIN: CPT | Mod: 93
